# Patient Record
Sex: FEMALE | Race: WHITE | Employment: FULL TIME | URBAN - METROPOLITAN AREA
[De-identification: names, ages, dates, MRNs, and addresses within clinical notes are randomized per-mention and may not be internally consistent; named-entity substitution may affect disease eponyms.]

---

## 2018-07-30 ENCOUNTER — TELEPHONE (OUTPATIENT)
Dept: INTERNAL MEDICINE CLINIC | Age: 63
End: 2018-07-30

## 2018-07-30 NOTE — TELEPHONE ENCOUNTER
#[phone] pt would like to be seen as a NP sooner than what is available with either Dr. Noel Connelly or Dr. Shaheed Rojas. Please call pt.

## 2018-07-30 NOTE — TELEPHONE ENCOUNTER
Spoke with patient. Two pt identifiers confirmed. Patient states that she is new to the area and is trying to get established with a provider. Patient states that she has several medical issues that need to be address and would prefer not waiting until November. Patient offered an appointment on 08/29/18 at 10am.  Patient accepted. Patient advised if anything changes or if unable to keep this appointment to call the office  Pt verbalized understanding of information discussed w/ no further questions at this time.

## 2018-07-30 NOTE — TELEPHONE ENCOUNTER
The patient scheduled a NP appointment on 11/7/18 and is requesting a sooner appointment.  (A)[phone]       Message received & copied from [facility]

## 2018-08-29 ENCOUNTER — OFFICE VISIT (OUTPATIENT)
Dept: INTERNAL MEDICINE CLINIC | Age: 63
End: 2018-08-29

## 2018-08-29 VITALS
RESPIRATION RATE: 16 BRPM | HEIGHT: 65 IN | OXYGEN SATURATION: 99 % | BODY MASS INDEX: 26.89 KG/M2 | WEIGHT: 161.4 LBS | HEART RATE: 60 BPM | DIASTOLIC BLOOD PRESSURE: 79 MMHG | SYSTOLIC BLOOD PRESSURE: 138 MMHG | TEMPERATURE: 98.1 F

## 2018-08-29 DIAGNOSIS — E04.1 THYROID NODULE: ICD-10-CM

## 2018-08-29 DIAGNOSIS — Z00.00 ROUTINE MEDICAL EXAM: Primary | ICD-10-CM

## 2018-08-29 DIAGNOSIS — E55.9 VITAMIN D DEFICIENCY: ICD-10-CM

## 2018-08-29 DIAGNOSIS — Z82.49 FAMILY HISTORY OF EARLY CAD: ICD-10-CM

## 2018-08-29 DIAGNOSIS — E78.2 MIXED HYPERLIPIDEMIA: ICD-10-CM

## 2018-08-29 DIAGNOSIS — Z85.850 HISTORY OF THYROID CANCER: ICD-10-CM

## 2018-08-29 RX ORDER — AZITHROMYCIN 250 MG/1
250 TABLET, FILM COATED ORAL SEE ADMIN INSTRUCTIONS
Qty: 6 TAB | Refills: 0 | Status: SHIPPED | OUTPATIENT
Start: 2018-08-29

## 2018-08-29 NOTE — PATIENT INSTRUCTIONS
RECOMMEND [id number] CALORIE DIET. TRACK CALORIE INTAKE WITH MY FITNESS PAL REGINE. PROTEIN AT EVERY MEAL. REDUCE INTAKE OF STARCHY CARBS, LIKE BREAD, RICE, PASTA, AND POTATOES. MAKE CARBS 1/4 OF YOUR PLATE. DRINK CALORIE FREE BEVERAGES ONLY. TRY GRAZING DIET, 3 SMALL MEALS AND 2 SNACKS. INCREASE CARDIOVASCULAR EXERCISE, MINIMUM 3 DAYS A WEEK, 30 MINUTES OF CARDIO. Vitamin D 5,000 iu daily. 3 days 15 minutes each time, no sunscreen.

## 2018-08-29 NOTE — PROGRESS NOTES
HPI: Lewis Beckman is a 59 y.o. female presents to establish. Has HLP. Took lipitor briefly, but stopped. Does not want to be on medication. Is swimming, 3 days a week. No sx with exertion. Cut back on \"junk\",  Eating out less. Less red meat. More chicken and fish, more veggies, lower carb. No weight loss. TAG 79, , HDL 98, Total Chol 284. May 29, prior to treatment and diet. Family history of CAD, all 4 GP, mother with CVA Had thyroid nodule removed that was cancer. 8 years ago. Noticed a nodule 2 months ago, cannot feel it today. Reports losing voice recently. Sees gyn, up to date on pap. No DUB. Annual mammogram.  Prior colonoscopy 2 years ago,normal. No family history of colon cancer. Constipation at times, taking magnesium. Vitamin D 12. In May. Taking vitamin  1,000 iu daily. Caretaker for . Reports he has neuropathy from DM. He has poor function. Was originally thought to have dementia, but does not. Patient has 1 step daughter and 1 stepson here, 6 grandkids. Supportive son and daughter, [place].   
 
 
 
ROS: 
Constitutional: negative for fevers, chills, anorexia, weight loss Eyes:   negative for visual disturbance, irritation ENT:   negative for tinnitus,sore throat,nasal congestion,ear pain, Respiratory:  negative for cough, hemoptysis, dyspnea,wheezing CV:   negative for chest pain, palpitations, lower extremity edema GI:   negative for nausea, vomiting, diarrhea, abdominal pain,melena Endo:               negative for polyuria,polydipsia,polyphagia,heat intolerance Genitourinary: negative for frequency, dysuria, hematuria Integument:  negative for rash, pruritus Hematologic:  negative for easy bruising and gum/nose bleeding Musculoskel: negative for myalgias, back pain, muscle weakness, joint pain Neurological:  negative for headaches, dizziness, gait problems, numbness Behavl/Psych: negative for feelings of anxiety, depression, mood changes Past Medical History:  
Diagnosis Date  History of cancer 2007  
 tumor on thyroid  Seizures ([address]   
 about 25 years ago Past Surgical History:  
Procedure Laterality Date  HX CYST INCISION AND DRAINAGE  HX CYST REMOVAL  2007 thyroid  HX HYSTERECTOMY Social History Social History  Marital status:  Spouse name: N/A  
 Number of children: N/A  
 Years of education: N/A Social History Main Topics  Smoking status: Former Smoker Packs/day: 0.50 Years: 20.00 Quit date: 3/7/2002  Smokeless tobacco: Never Used  Alcohol use Yes Comment: occasionally  Drug use: No  
 Sexual activity: Yes  
  Partners: Male Birth control/ protection: None Other Topics Concern  None Social History Narrative Family History Problem Relation Age of Onset  Cancer Mother   
  kidney  Stroke Mother  Cancer Father   
  unknown  Hypertension Sister  Hypertension Brother  Heart Disease Brother   
  age 61, CAD with stent  Heart Disease Maternal Grandmother  Heart Disease Maternal Grandfather   
   52's mi  
 Heart Disease Paternal Grandmother  Heart Disease Paternal Grandfather   
   52's mi  
 
Current Outpatient Prescriptions Medication Sig Dispense Refill  azithromycin (ZITHROMAX) 250 mg tablet Take 1 Tab by mouth See Admin Instructions. 6 Tab 0  
 cetirizine (ZYRTEC) 10 mg tablet Take 1 Tab by mouth daily. 30 Tab 5 No Known Allergies Physical exam: 
Visit Vitals  /79 (BP 1 Location: Left arm, BP Patient Position: Sitting)  Pulse 60  Temp 98.1 °F (36.7 °C) (Oral)  Resp 16  
 Ht 5' 5\" (1.651 m)  Wt 161 lb 6.4 oz (73.2 kg)  LMP Comment: 1995  SpO2 99%  BMI 26.86 kg/m2 General appearance - alert, well appearing, and in no distress HEENT- PERLL,normal conjunctiva, TM normal bilaterally, hearing grossly normal, normal nasal turbinates, no sinus tenderness, mucous membranes moist, pharynx normal without lesions Neck - supple, no significant adenopathy, small mobile nodule near thyroid scar Pulm- clear to auscultation, no wheezes, rales or rhonchi CV- normal rate, regular rhythm, normal S1, S2, no murmurs Abdomen - soft, nontender, nondistended, no masses or organomegaly Extrem-peripheral pulses normal, no pedal edema Neuro -alert, oriented,nonfocal 
 
Assessment/Plan: 1. Mixed hyperlipidemia- Recommend AHA diet. Currently not on medication.  
 
- NMR LIPOPROFILE; Future 2. Thyroid nodule - US THYROID/PARATHYROID/SOFT TISS; Future 3. History of thyroid cancer 4. Family history of early CAD 
 
- [address]; Future 5. Routine medical exam- Recommend heart healthy diet and regular cardiovascular exercise.  
 
- NMR LIPOPROFILE; Future - CRP, HIGH SENSITIVITY; Future - METABOLIC PANEL, COMPREHENSIVE; Future - CBC W/O DIFF; Future 
- TSH 3RD GENERATION; Future - URINALYSIS W/ RFLX MICROSCOPIC; Future 6. Vitamin D deficiency- resume 5,000 iu vitamin D once a day. Follow-up Disposition: 
Return for follow up for fasting labs.   Salome Gu MD

## 2018-08-29 NOTE — PROGRESS NOTES
Reviewed record in preparation for visit and have obtained necessary documentation. Identified pt with two pt identifiers(name and ). Chief Complaint Patient presents with [address] [place] [address] Pt non fasting  New Patient  Medication Evaluation  Complete Physical  
 
 
Health Maintenance Due Topic Date Due  
 Hepatitis C Test  1955  DTaP/Tdap/Td  (1 - Tdap) 1977  Cervical Cancer Screening  1977  Stool testing for trace blood  2006  Shingles Vaccine  2016  Breast Cancer Screening  2018  Flu Vaccine  2018 Ms. Slade Lam has a reminder for a \"due or due soon\" health maintenance. I have asked that she discuss this further with her primary care provider for follow-up on this health maintenance. Coordination of Care Questionnaire: 
:  
 
1) Have you been to an emergency room, urgent care clinic since your last visit? no  
Hospitalized since your last visit? no          
 
2) Have you seen or consulted any other health care providers outside of [address] [place] [address] since your last visit? no  (Include any pap smears or colon screenings in this section.) 3) In the event something were to happen to you and you were unable to speak on your behalf, do you have an Advance Directive/ Living Will in place stating your wishes? YES Do you have an Advance Directive on file? no 
 
4) Are you interested in receiving information on Advance Directives? NO Patient is accompanied by self I have received verbal consent from [facility] to discuss any/all medical information while they are present in the room.

## 2018-08-29 NOTE — MR AVS SNAPSHOT
[address] [place] [address] 
[phone] Patient: Skip Myrick MRN: [id number] TKS:2[birth year]40 Visit Information Date & Time Provider Department Dept. Phone Encounter #  
 8/29/2018 10:00 AM Missael Beck, [facility] [phone] [id number] Follow-up Instructions Return for follow up for fasting labs. Sofiya Lind Upcoming Health Maintenance Date Due Hepatitis C Screening 1955 DTaP/Tdap/Td series (1 - Tdap) 6/25/1977 PAP AKA CERVICAL CYTOLOGY 6/25/1977 FOBT Q 1 YEAR AGE 50-75 6/25/2006 ZOSTER VACCINE AGE 60> 4/25/2016 BREAST CANCER SCRN MAMMOGRAM 4/18/2018 Influenza Age 5 to Adult 8/1/2018 Allergies as of 8/29/2018  Review Complete On: 8/29/2018 By: Missael Beck MD  
 No Known Allergies Current Immunizations  Reviewed on 4/22/2016 No immunizations on file. Not reviewed this visit You Were Diagnosed With   
  
 Codes Comments Routine medical exam    -  Primary ICD-10-CM: Z00.00 ICD-9-CM: V70.0 Mixed hyperlipidemia     ICD-10-CM: E78.2 ICD-9-CM: 272.2 Thyroid nodule     ICD-10-CM: E04.1 ICD-9-CM: 241.0 History of thyroid cancer     ICD-10-CM: Z85.850 ICD-9-CM: V10.87 Family history of early CAD     ICD-10-CM: Z82.49 
ICD-9-CM: V17.3 Vitamin D deficiency     ICD-10-CM: E55.9 ICD-9-CM: 268.9 Vitals BP Pulse Temp Resp Height(growth percentile) Weight(growth percentile) 138/79 (BP 1 Location: Left arm, BP Patient Position: Sitting) 60 98.1 °F (36.7 °C) (Oral) 16 5' 5\" (1.651 m) 161 lb 6.4 oz (73.2 kg) SpO2 BMI OB Status Smoking Status 99% 26.86 kg/m2 Hysterectomy Former Smoker BMI and BSA Data Body Mass Index Body Surface Area  
 26.86 kg/m 2 1.83 m 2 Preferred Pharmacy Pharmacy Name Phone  [id number], [place] AT [facility] [phone] Your Updated Medication List  
  
   
This list is accurate as of 8/29/18 11:44 AM.  Always use your most recent med list.  
  
  
  
  
 cetirizine 10 mg tablet Commonly known as:  ZYRTEC Take 1 Tab by mouth daily. Follow-up Instructions Return for follow up for fasting labs. Sergio Benavidesmike To-Do List   
 08/30/2018 Lab:  CBC W/O DIFF   
  
 08/30/2018 Lab:  CRP, HIGH SENSITIVITY   
  
 08/30/2018 Lab:  METABOLIC PANEL, COMPREHENSIVE   
  
 08/30/2018 Lab:  NMR LIPOPROFILE   
  
 08/30/2018 Lab:  TSH 3RD GENERATION   
  
 08/30/2018 Lab:  URINALYSIS W/ RFLX MICROSCOPIC   
  
 08/30/2018 Imaging:  US THYROID/PARATHYROID/SOFT TISS Patient Instructions RECOMMEND [id number] CALORIE DIET. TRACK CALORIE INTAKE WITH MY FITNESS PAL REGINE. PROTEIN AT EVERY MEAL. REDUCE INTAKE OF STARCHY CARBS, LIKE BREAD, RICE, PASTA, AND POTATOES. MAKE CARBS 1/4 OF YOUR PLATE. DRINK CALORIE FREE BEVERAGES ONLY. TRY GRAZING DIET, 3 SMALL MEALS AND 2 SNACKS. INCREASE CARDIOVASCULAR EXERCISE, MINIMUM 3 DAYS A WEEK, 30 MINUTES OF CARDIO. Vitamin D 5,000 iu daily. 3 days 15 minutes each time, no sunscreen. Introducing [facility]! Rashaad Post introduces [employer] patient portal. Now you can access parts of your medical record, email your doctor's office, and request medication refills online. 1. In your internet browser, go to [email or web address] ([email or web address])t 2. Click on the First Time User? Click Here link in the Sign In box. You will see the New Member Sign Up page. 3. Enter your [employer] Access Code exactly as it appears below. You will not need to use this code after youve completed the sign-up process. If you do not sign up before the expiration date, you must request a new code. · [employer] Access Code: [email or web address] Expires: 11/27/2018 11:44 AM 
 
 4. Enter the last four digits of your Social Security Number (xxxx) and Date of Birth (mm/dd/yyyy) as indicated and click Submit. You will be taken to the next sign-up page. 5. Create a [employer] ID. This will be your [employer] login ID and cannot be changed, so think of one that is secure and easy to remember. 6. Create a [employer] password. You can change your password at any time. 7. Enter your Password Reset Question and Answer. This can be used at a later time if you forget your password. 8. Enter your e-mail address. You will receive e-mail notification when new information is available in [address] 9. Click Sign Up. You can now view and download portions of your medical record. 10. Click the Download Summary menu link to download a portable copy of your medical information. If you have questions, please visit the Frequently Asked Questions section of the [employer] website. Remember, [employer] is NOT to be used for urgent needs. For medical emergencies, dial 911. Now available from your iPhone and Android! Please provide this summary of care documentation to your next provider. Your primary care clinician is listed as Osmar Durán. If you have any questions after today's visit, please call [phone].

## 2018-09-07 ENCOUNTER — TELEPHONE (OUTPATIENT)
Dept: INTERNAL MEDICINE CLINIC | Age: 63
End: 2018-09-07

## 2018-09-07 NOTE — TELEPHONE ENCOUNTER
#[phone] (OPTION #2)  Ernestina with [employer] would like a call back today pertaining to order for US THYROID. She has questions on this.

## 2018-09-07 NOTE — TELEPHONE ENCOUNTER
Please fax new order for US thyroid to [facility] #[phone]   This is because they are in network and the previous place was not. Please fax over as soon as you can she ask.  Thanks

## 2018-09-10 NOTE — TELEPHONE ENCOUNTER
Spoke with [facility] with [address] [place] [address]. Answered all questions pertaining to order for thyroid ultrasound.

## 2018-09-21 ENCOUNTER — TELEPHONE (OUTPATIENT)
Dept: INTERNAL MEDICINE CLINIC | Age: 63
End: 2018-09-21

## 2018-09-21 NOTE — TELEPHONE ENCOUNTER
Patient would like to get her test results. Her number is [phone].        Message received & copied from [facility]

## 2018-09-24 NOTE — TELEPHONE ENCOUNTER
Spoke with patient. Two pt identifiers confirmed. Patient advised that we have not received her results for [facility]. Patient advised that I will request records and have Dr. Linda Ozuna review once they have been received. [address] Records. Left message regarding results, advised on message that Dr. Linda Ozuna is the ordering physician. Fax number given. Advised to call them to call with any additional questions.

## 2018-09-27 ENCOUNTER — TELEPHONE (OUTPATIENT)
Dept: INTERNAL MEDICINE CLINIC | Age: 63
End: 2018-09-27

## 2018-09-27 NOTE — TELEPHONE ENCOUNTER
Patient states she needs a call back in reference to getting her results for her Ultrasound of the Thyroid. Please call.  Thank you

## 2018-09-28 NOTE — TELEPHONE ENCOUNTER
Called, spoke to pt. Two identifiers confirmed. Notified pt that US results are still not available. Let pt know that I checked faxes and everywhere in chart and they are still not available. Notified pt we will continue to call [place] and touch base with again on Monday.

## 2018-10-01 NOTE — TELEPHONE ENCOUNTER
Pt is requesting a call back in regards to results from ultrasound for thyroid done on 9/18/18.  Best contact [phone]    Copy/paste Nazanin Doherty

## 2018-10-02 NOTE — TELEPHONE ENCOUNTER
MD Hernandez Blackwell LPN        Caller: Unspecified (5 days ago,  2:00 PM)                     Reviewed ultrasound 2 small thyroid nodules by ultrasound, described by radiologist to have benign features, both less than 2 cm in size.  Can recheck in one year for stability.        Spoke with patient. Two pt identifiers confirmed. Patient notified of the above message from Dr. Lizzie Denton regarding her ultrasound results. Pt verbalized understanding of information discussed w/ no further questions at this time.